# Patient Record
Sex: FEMALE | Race: WHITE
[De-identification: names, ages, dates, MRNs, and addresses within clinical notes are randomized per-mention and may not be internally consistent; named-entity substitution may affect disease eponyms.]

---

## 2020-07-18 ENCOUNTER — HOSPITAL ENCOUNTER (EMERGENCY)
Dept: HOSPITAL 61 - ER | Age: 75
Discharge: HOME | End: 2020-07-18
Payer: COMMERCIAL

## 2020-07-18 VITALS — DIASTOLIC BLOOD PRESSURE: 65 MMHG | SYSTOLIC BLOOD PRESSURE: 120 MMHG

## 2020-07-18 VITALS — BODY MASS INDEX: 24.34 KG/M2 | WEIGHT: 132.28 LBS | HEIGHT: 62 IN
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
## 2020-07-18 DIAGNOSIS — S70.311A: Primary | ICD-10-CM
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
## 2020-07-18 PROCEDURE — 90375 RABIES IG IM/SC: CPT

## 2020-07-18 PROCEDURE — 96372 THER/PROPH/DIAG INJ SC/IM: CPT

## 2020-07-18 PROCEDURE — 90471 IMMUNIZATION ADMIN: CPT

## 2020-07-18 PROCEDURE — 99284 EMERGENCY DEPT VISIT MOD MDM: CPT

## 2020-07-18 PROCEDURE — 90675 RABIES VACCINE IM: CPT

## 2020-07-18 NOTE — PHYS DOC
General Adult


EDM:


Chief Complaint:  ANIMAL BITE





HPI:


HPI:





Patient is a 75  year old female who presents with had a mama wrecking a baby 

raccoons living in a trash barrel in her backyard.  She heard crying in the 

trash barrel and dumped it over.  She states 1 of the baby raccoons ran straight

for her and jumped on her right thigh and bit her on her right inner thigh this 

morning.  She states her last tetanus shot was 1 year ago.  Patient has a 1 inch

superficial scratch to the right upper inner thigh.  She has 1 small puncture 

lavelle directly below that area.  No bleeding or redness or signs of infection at 

this time.  Patient has agreed to the rabies series vaccines. She rates her pain

a 6/10.  Patient states her only medical history is breast cancer back in the 

1990s.  She had a bilateral mastectomy.  She states that she has a family 

history of breast cancer, MI, colon cancer.





Review of Systems:


Review of Systems:


Constitutional:   Denies fever or chills. []


Eyes:   Denies change in visual acuity. []


HENT:   Denies nasal congestion or sore throat. [] 


Respiratory:   Denies cough or shortness of breath. [] 


Cardiovascular:   Denies chest pain or edema. [] 


GI:   Denies abdominal pain, nausea, vomiting, bloody stools or diarrhea. [] 


:  Denies dysuria. [] 


Musculoskeletal:   Denies back pain or joint pain.  Right upper inner thigh.  []

 


Integument:   Denies rash.  Right upper inner thigh 1 inch superficial scratch 

and one puncture wound directly below the area.  [] 


Neurologic:   Denies headache, focal weakness or sensory changes. [] 


Endocrine:   Denies polyuria or polydipsia. [] 


Lymphatic:  Denies swollen glands. [] 


Psychiatric:  Denies depression or anxiety. []





Heart Score:


Risk Factors:


Risk Factors:  DM, Current or recent (<one month) smoker, HTN, HLP, family 

history of CAD, obesity.


Risk Scores:


Score 0 - 3:  2.5% MACE over next 6 weeks - Discharge Home


Score 4 - 6:  20.3% MACE over next 6 weeks - Admit for Clinical Observation


Score 7 - 10:  72.7% MACE over next 6 weeks - Early Invasive Strategies





Current Medications:





Current Medications








 Medications


  (Trade)  Dose


 Ordered  Sig/Kiel  Start Time


 Stop Time Status Last Admin


Dose Admin


 


 Rabies Immune


 Globulin


  (HyperRAB 300


 UNIT/ML 5ML VIAL)  5 ml  ONCE ONCE  7/18/20 13:45


 7/18/20 13:46 UNV  





 


 Rabies Vaccine


 Human Diploid Cell


  (Imovax Rabies


 2.5 Unit / ml)  1 ml  ONCE ONCE  7/18/20 13:45


 7/18/20 13:46 UNV  














Physical Exam:


PE:





Constitutional: Well developed, well nourished, no acute distress, non-toxic 

appearance. []


HENT: Normocephalic, atraumatic, bilateral external ears normal, oropharynx 

moist, no oral exudates, nose normal. []


Eyes: PERRLA, EOMI, conjunctiva normal, no discharge. [] 


Neck: Normal range of motion, no tenderness, supple, no stridor. [] 


Cardiovascular:Heart rate regular rhythm, no murmur []


Lungs & Thorax:  Bilateral breath sounds clear to auscultation []


Abdomen: Bowel sounds normal, soft, no tenderness, no masses, no pulsatile 

masses. [] 


Skin: Warm, dry, no erythema, no rash. Right inner thigh 1 inch scratch and 1 

puncture distally. [] 


Back: No tenderness, no CVA tenderness. [] 


Extremities: No tenderness, no cyanosis, no clubbing, ROM intact, no edema. [] 


Neurologic: Alert and oriented X 3, normal motor function, normal sensory 

function, no focal deficits noted. []


Psychologic: Affect normal, judgement normal, mood normal. []





EKG:


EKG:


[]





Radiology/Procedures:


Radiology/Procedures:


[]





Course & Med Decision Making:


Course & Med Decision Making


Pertinent Labs and Imaging studies reviewed. (See chart for details)





Ambulatory with a steady gait.  Skin pink warm and dry.  Vital signs within 

normal limits.  See HPI.  Patient is given the rabies immunoglobulin and vaccine

 today.  I have written a prescription has been faxed to outpatient and given to

 the patient for her to return on day 3, 7 and 14 for the rest of the rabies 

vaccines.  Wound cleaned with chlorhexidine and patient is placed on Augmentin 

antibiotic.





[]





Dragon Disclaimer:


Dragon Disclaimer:


This electronic medical record was generated, in whole or in part, using a voice

 recognition dictation system.





Departure


Departure


Impression:  


   Primary Impression:  


   Animal bite


Disposition:  01 HOME, SELF-CARE


Condition:  STABLE


Referrals:  


NO PCP (PCP)


Patient Instructions:  Animal Bite, Easy-to-Read, Rabies Immune Globulin, human 

RIG solution for injection, Rabies Vaccine suspension for injection





Additional Instructions:  


Return to the emergency room for rabies vaccine on July 21, July 25 and August 1.  Take the antibiotic as prescribed.  Watch for signs infection.  Keep it 

clean and covered.


Scripts


Amoxicillin/Potassium Clav (AUGMENTIN 875-125 TABLET) 1 Each Tablet


1 TAB PO BID for 10 Days, #20 TAB 0 Refills


   Prov: MERY GUIDO         7/18/20





Justicifation of Admission Dx:


Justifications for Admission:


Justification of Admission Dx:  N/A











MERY GUIDO            Jul 18, 2020 14:01

## 2022-04-06 ENCOUNTER — HOSPITAL ENCOUNTER (EMERGENCY)
Dept: HOSPITAL 61 - ER | Age: 77
Discharge: HOME | End: 2022-04-06
Payer: COMMERCIAL

## 2022-04-06 VITALS — BODY MASS INDEX: 28.23 KG/M2 | WEIGHT: 165.35 LBS | HEIGHT: 64 IN

## 2022-04-06 VITALS — SYSTOLIC BLOOD PRESSURE: 126 MMHG | DIASTOLIC BLOOD PRESSURE: 68 MMHG
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
## 2022-04-06 DIAGNOSIS — S82.892A: Primary | ICD-10-CM
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
## 2022-04-06 PROCEDURE — 73610 X-RAY EXAM OF ANKLE: CPT

## 2022-04-06 PROCEDURE — 99283 EMERGENCY DEPT VISIT LOW MDM: CPT

## 2022-04-06 NOTE — PHYS DOC
Past Medical History


Past Medical History:  Other


Additional Past Medical Histor:  BILATERAL BREAST CANCER


Past Surgical History:  Other


Additional Past Surgical Histo:  DOUBLE MASECTOMY


Smoking Status:  Never Smoker


Alcohol Use:  None





Adult General


Chief Complaint


Chief Complaint:  ANKLE PROBLEM





HPI


HPI





Patient is a 77  year old female presenting to the emergency department for 

evaluation of left ankle pain status post injury sustained yesterday when she 

was walking and reportedly did not see a drop off and rolled her ankle but she 

does not know if she inverted it or if it rolled out.  Patient denies any other 

areas of injury or pain and she denies any weakness numbness or tingling.  There

are no open wounds or abrasions.





Review of Systems


Review of Systems





Constitutional: Denies fever or chills []


Musculoskeletal: Denies back pain.  Positive left ankle joint pain


Integument: Denies rash or skin lesions []


Neurologic: Denies headache, focal weakness or sensory changes []





All other systems were reviewed and found to be within normal limits, except as 

documented in this note.





Allergies


Allergies





Allergies








Coded Allergies Type Severity Reaction Last Updated Verified


 


  No Known Drug Allergies    7/21/20 No











Physical Exam


Physical Exam





Constitutional: Well developed, well nourished, no acute distress, non-toxic 

appearance. []


Skin: Warm, dry, no erythema


Back: No tenderness, no CVA tenderness. [] 


Extremities: No proximal tib-fib tenderness to palpation.  No bony foot 

tenderness to palpation.  Medial and lateral malleolus tenderness to palpation. 

 No obvious deformity or swelling.


Neurologic: Alert and oriented X 3, normal motor function, normal sensory 

function, no focal deficits noted. []


Psychologic: Affect normal, judgement normal, mood normal. []





Current Patient Data


Vital Signs





                                   Vital Signs








  Date Time  Temp Pulse Resp B/P (MAP) Pulse Ox O2 Delivery O2 Flow Rate FiO2


 


4/6/22 10:15 98.6 86 16 126/68 (87) 99 Room Air  





 98.6       











EKG


EKG


[]





Radiology/Procedures


Radiology/Procedures


[]





Course & Med Decision Making


Course & Med Decision Making


I have to give her something for pain while we are waiting for the x-ray results

 and she refused.





Radiologist felt that there could possibly be a fracture if she has point 

tenderness at her medial malleolus which she does so I will treat her as a 

fracture for now and put her in a splint crutches and have her follow with 

orthopedics within 1 week for recheck.  Patient was neurovascular intact pre and

 post splinting and she was discharged in stable condition and all questions 

were answered.  Patient aware and agreeable with plan for discharge and 

verbalized understanding of the above instructions.





Dragon Disclaimer


Dragon Disclaimer


This electronic medical record was generated, in whole or in part, using a voice

 recognition dictation system.





Departure


Departure


Impression:  


   Primary Impression:  


   Closed left ankle fracture


Disposition:  01 HOME / SELF CARE / HOMELESS


Condition:  STABLE


Referrals:  


NO PCP (PCP)








BRYAN ELLISON MD


Patient Instructions:  Ankle Fracture





Problem Qualifiers








   Primary Impression:  


   Closed left ankle fracture


   Encounter type:  initial encounter  Qualified Codes:  S82.892A - Other 

   fracture of left lower leg, initial encounter for closed fracture








LISA SHINE DO              Apr 6, 2022 10:32

## 2022-04-06 NOTE — RAD
XR EXAM OF ANKLE_LEFT 3V



History: Reason: pain s/p injury / Spl. Instructions:  / History: 



Technique: 3 views left ankle



Comparison: None.



Findings:

No dislocation. Symmetric ankle mortise. Linear sclerosis and irregularity of the medial malleolus. L
ateral ankle soft tissue swelling. Also navicula noted. Ankle joint effusion.



Impression: 

1.  Linear sclerosis and irregularity of the medial malleolus, may relate to prior trauma although ag
e indeterminate. Recommend correlation with point tenderness to evaluate acuity. CT can further evalu
ate if persistent clinical concern.

2.  Ankle soft tissue swelling and joint effusion.



Electronically signed by: Benigno Rosario DO (4/6/2022 11:36 AM) [id number]